# Patient Record
Sex: FEMALE | Race: WHITE | Employment: OTHER | URBAN - METROPOLITAN AREA
[De-identification: names, ages, dates, MRNs, and addresses within clinical notes are randomized per-mention and may not be internally consistent; named-entity substitution may affect disease eponyms.]

---

## 2021-03-03 DIAGNOSIS — F41.9 ANXIETY: Primary | ICD-10-CM

## 2021-03-03 RX ORDER — LORAZEPAM 1 MG/1
TABLET ORAL
Qty: 5 TABLET | Refills: 0 | Status: SHIPPED | OUTPATIENT
Start: 2021-03-03 | End: 2021-03-04

## 2021-03-03 NOTE — PROGRESS NOTES
[place] [facility]  PRIMARY CARE PROVIDER AND CLINIC:  [facility], [address] / [facility] [address]  Chief Complaint   Patient presents with     Hospital F/U     [place] Medical Record Number:  [id number]  Place of Service where encounter took place:  No question data found.    Nicole Guevara  is a 66 year old  (1955), admitted to the above facility from  [facility]. Hospital stay 2/23/21 through 3/3/21..  Admitted to this facility for  rehab, medical management and nursing care.    HPI:    HPI information obtained from: facility chart records, facility staff, patient report and [facility] chart review.   Brief Summary of Hospital Course: Nicole had elective S/p L3-4, L4-5, L5-S1 Posterolateral Fusion, L4-5 Decompression, Posterior Lumbar Interbody Fusion, O-Arm, Stealth, Cell Saver by Dr Trivedi to treat 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  Hospitalization notable for hyperglycemia for DMII and lantus started, pain control, and blood loss anemia noted in progress notes, but HGB in labs stable of 9's. , hypotension - ACE decreased   Notable PMH of morbid obesity, T2DM, HERNANDEZ, HLD    Newly on lantus, prn Tylenol , methocarbamol, oxycodone. TOM increased and ACE was decreased     Updates on Status Since Skilled nursing Admission: difficulty getting medications due to insurance, pt was in sig pain last night. Now much better after receiving needing/ordered medications.     CODE STATUS/ADVANCE DIRECTIVES DISCUSSION:   CPR/Full code   Patient's living condition: lives alone  ALLERGIES: Ibuprofen, Metal [staples], and Penicillins  PAST MEDICAL HISTORY:  has no past medical history on file.  PAST SURGICAL HISTORY:   has no past surgical history on file.  FAMILY HISTORY: family history is not on file.  SOCIAL HISTORY:       Post Discharge Medication Reconciliation Status: discharge medications reconciled, continue medications  without change    Current Outpatient Medications   Medication Sig Dispense Refill     acetaminophen (TYLENOL) 500 MG tablet Take 1,000 mg by mouth every 6 hours       albuterol (PROAIR HFA/PROVENTIL HFA/VENTOLIN HFA) 108 (90 Base) MCG/ACT inhaler Inhale 2 puffs into the lungs 4 times daily as needed for shortness of breath / dyspnea or wheezing       amitriptyline (ELAVIL) 50 MG tablet Take 50 mg by mouth At Bedtime       aspirin 81 MG EC tablet Take 81 mg by mouth daily       bulk laxative (BENEFIBER DRINK MIX) packet Take 1 packet by mouth daily as needed       fluticasone (FLONASE) 50 MCG/ACT nasal spray Spray 1 spray into both nostrils daily as needed for rhinitis or allergies       gabapentin (NEURONTIN) 400 MG capsule Take 400 mg by mouth 3 times daily       insulin aspart (NOVOLOG FLEXPEN) 100 UNIT/ML pen Inject 22 Units Subcutaneous 2 times daily (with meals) Lunch/dinner       insulin aspart (NOVOLOG FLEXPEN) 100 UNIT/ML pen Inject 24 Units Subcutaneous daily (with breakfast)       insulin aspart (NOVOLOG FLEXPEN) 100 UNIT/ML pen Inject Subcutaneous At Bedtime Per Sliding Scale;  If Blood Sugar is 151 to 200, give 1 Units.  If Blood Sugar is 201 to 250, give 2 Units.  If Blood Sugar is 251 to 300, give 4 Units.  If Blood Sugar is 301 to 350, give 6 Units.  If Blood Sugar is 351 to 400, give 8 Units.  If Blood Sugar is greater than 400, give 10 Units.       insulin aspart (NOVOLOG FLEXPEN) 100 UNIT/ML pen Inject Subcutaneous 3 times daily (with meals) Per Sliding Scale;  If Blood Sugar is 151 to 200, give 2 Units.  If Blood Sugar is 201 to 250, give 4 Units.  If Blood Sugar is 251 to 300, give 6 Units.  If Blood Sugar is 301 to 350, give 8 Units.  If Blood Sugar is 351 to 400, give 10 Units.  If Blood Sugar is greater than 400, give 12 Units.       insulin glargine (LANTUS PEN) 100 UNIT/ML pen Inject 38 Units Subcutaneous At Bedtime       lisinopril (ZESTRIL) 10 MG tablet Take 10 mg by mouth daily        LORazepam (ATIVAN) 1 MG tablet Take 1 mg PO in AM and 2 mg PO HS daily. 30 tablet 1     methocarbamol (ROBAXIN) 750 MG tablet Take 750 mg by mouth 3 times daily as needed for muscle spasms       oxyCODONE (ROXICODONE) 5 MG tablet Take 5-10 mg by mouth every 4 hours as needed for severe pain       polyethylene glycol (MIRALAX) 17 g packet Take 17 g by mouth daily       QUEtiapine (SEROQUEL XR) 50 MG TB24 24 hr tablet Take 50 mg by mouth At Bedtime       sennosides (SENOKOT) 8.6 MG tablet Take 2 tablets by mouth 2 times daily       simvastatin (ZOCOR) 5 MG tablet Take 5 mg by mouth At Bedtime       ROS:  4 point ROS including Respiratory, CV, GI and , other than that noted in the HPI,  is negative    Vitals:  BP (!) 141/79   Pulse 84   Temp 98.8  F (37.1  C)   Resp 18   SpO2 96%   Exam:  GENERAL APPEARANCE:  Alert, in no distress  RESP:  respiratory effort and palpation of chest normal, auscultation of lungs clear , no respiratory distress  CV:  Palpation and auscultation of heart done , rate and rhythm reg, no murmur, no peripheral edema  ABDOMEN:  normal bowel sounds, soft, nontender, no hepatosplenomegaly or other masses  M/S:   Gait and station w/c - SBA, Digits and nails intact  SKIN:  Inspection and Palpation of skin and subcutaneous tissue incision not viewed by me as TSLO  Already on, but nsg notes c/d/i  NEURO: 2-12 in normal limits and at patient's baseline  PSYCH:  insight and judgement, memory intact , affect and mood Pleasant     Lab/Diagnostic data:  Recent labs in [facility] reviewed by me today.          ASSESSMENT/PLAN:  Lumbosacral spondylosis with radiculopathy and History of lumbosacral spine surgery: S/p L3-4, L4-5, L5-S1 Posterolateral Fusion, L4-5 Decompression, Posterior Lumbar Interbody Fusion, 2/23/21 and Bilateral sacroiliitis (H)  Doing quite well post op - states pain is now well controlled with medications. Using 10 mg prn oxy - has used 3 times today  Could move to scheduled if needed  next f/u  I dose prn robaxin    Type 2 diabetes mellitus with diabetic neuropathy, with long-term current use of insulin (H)  Well controlled - tight prn sliding scale coverage - will keep for now being so close to post op - but look next week to liberate more and stop hs given risk of hypoglycemia    Hypertension associated with diabetes (H)  Stable with reduced ACE - f/u next week - would increase if needed.     BMI 45.0-49.9, adult (H)  Noted - BMI>30, is consistent with obesity, BMI>35 with complicating disease process or BMI>40 is consistent with morbid obesity. This is clinically significant due to increased nursing cares, use of resources and specialty equipment.    Anxiety  - LORazepam (ATIVAN) 1 MG tablet; Take 1 mg PO in AM and 2 mg PO HS daily.     Orders written by provider at facility  Script to ativan sent to A/E  F/u next week with HTN and Accuchecks /DMII    Code status  Full code -     Total time spent with patient visit at the skilled nursing facility was 35 min including patient visit and review of past records. Greater than 50% of total time spent with counseling and coordinating care due to pain control, medication issues and dmii/insulin changes/hypoptension/ace changes. .     Electronically signed by:  BECKIE Lara CNP

## 2021-03-04 ENCOUNTER — HOSPITAL LABORATORY (OUTPATIENT)
Dept: NURSING HOME | Facility: OTHER | Age: 66
End: 2021-03-04

## 2021-03-04 ENCOUNTER — NURSING HOME VISIT (OUTPATIENT)
Dept: GERIATRICS | Facility: CLINIC | Age: 66
End: 2021-03-04
Payer: MEDICAID

## 2021-03-04 VITALS
DIASTOLIC BLOOD PRESSURE: 79 MMHG | SYSTOLIC BLOOD PRESSURE: 141 MMHG | OXYGEN SATURATION: 96 % | RESPIRATION RATE: 18 BRPM | TEMPERATURE: 98.8 F | HEART RATE: 84 BPM

## 2021-03-04 DIAGNOSIS — I15.2 HYPERTENSION ASSOCIATED WITH DIABETES (H): ICD-10-CM

## 2021-03-04 DIAGNOSIS — E11.59 HYPERTENSION ASSOCIATED WITH DIABETES (H): ICD-10-CM

## 2021-03-04 DIAGNOSIS — Z79.4 TYPE 2 DIABETES MELLITUS WITH DIABETIC NEUROPATHY, WITH LONG-TERM CURRENT USE OF INSULIN (H): ICD-10-CM

## 2021-03-04 DIAGNOSIS — Z98.890 HISTORY OF LUMBOSACRAL SPINE SURGERY: ICD-10-CM

## 2021-03-04 DIAGNOSIS — F41.9 ANXIETY: ICD-10-CM

## 2021-03-04 DIAGNOSIS — M46.1 BILATERAL SACROILIITIS (H): ICD-10-CM

## 2021-03-04 DIAGNOSIS — M47.27 LUMBOSACRAL SPONDYLOSIS WITH RADICULOPATHY: Primary | ICD-10-CM

## 2021-03-04 DIAGNOSIS — E11.40 TYPE 2 DIABETES MELLITUS WITH DIABETIC NEUROPATHY, WITH LONG-TERM CURRENT USE OF INSULIN (H): ICD-10-CM

## 2021-03-04 PROBLEM — M53.3 CHRONIC SI JOINT PAIN: Status: ACTIVE | Noted: 2017-02-23

## 2021-03-04 PROBLEM — M17.11 ARTHRITIS OF RIGHT KNEE: Status: ACTIVE | Noted: 2018-10-25

## 2021-03-04 PROBLEM — Z78.9 HEALTH CARE HOME, ACTIVE CARE COORDINATION: Status: ACTIVE | Noted: 2017-06-12

## 2021-03-04 PROBLEM — M75.101 ROTATOR CUFF TEAR ARTHROPATHY OF RIGHT SHOULDER: Status: ACTIVE | Noted: 2017-02-23

## 2021-03-04 PROBLEM — Z96.652 STATUS POST TOTAL LEFT KNEE REPLACEMENT: Status: ACTIVE | Noted: 2019-06-25

## 2021-03-04 PROBLEM — E11.42 DIABETIC POLYNEUROPATHY ASSOCIATED WITH TYPE 2 DIABETES MELLITUS (H): Status: ACTIVE | Noted: 2017-06-12

## 2021-03-04 PROBLEM — M17.12 ARTHRITIS OF LEFT KNEE: Status: ACTIVE | Noted: 2018-07-16

## 2021-03-04 PROBLEM — M12.811 ROTATOR CUFF TEAR ARTHROPATHY OF RIGHT SHOULDER: Status: ACTIVE | Noted: 2017-02-23

## 2021-03-04 PROBLEM — G89.29 CHRONIC SI JOINT PAIN: Status: ACTIVE | Noted: 2017-02-23

## 2021-03-04 PROBLEM — E66.01 OBESITY, MORBID (H): Status: ACTIVE | Noted: 2021-02-24

## 2021-03-04 PROCEDURE — 99310 SBSQ NF CARE HIGH MDM 45: CPT | Performed by: NURSE PRACTITIONER

## 2021-03-04 RX ORDER — INSULIN ASPART 100 [IU]/ML
22 INJECTION, SOLUTION INTRAVENOUS; SUBCUTANEOUS 2 TIMES DAILY WITH MEALS
COMMUNITY

## 2021-03-04 RX ORDER — SENNOSIDES 8.6 MG
2 TABLET ORAL 2 TIMES DAILY
COMMUNITY

## 2021-03-04 RX ORDER — FLUTICASONE PROPIONATE 50 MCG
1 SPRAY, SUSPENSION (ML) NASAL DAILY PRN
COMMUNITY

## 2021-03-04 RX ORDER — METHOCARBAMOL 750 MG/1
750 TABLET, FILM COATED ORAL 3 TIMES DAILY PRN
COMMUNITY
End: 2021-03-09

## 2021-03-04 RX ORDER — LORAZEPAM 1 MG/1
TABLET ORAL
Qty: 30 TABLET | Refills: 1 | Status: SHIPPED | OUTPATIENT
Start: 2021-03-04 | End: 2021-03-17

## 2021-03-04 RX ORDER — OXYCODONE HYDROCHLORIDE 5 MG/1
5-10 TABLET ORAL EVERY 4 HOURS PRN
COMMUNITY
End: 2021-03-08

## 2021-03-04 RX ORDER — POLYETHYLENE GLYCOL 3350 17 G/17G
17 POWDER, FOR SOLUTION ORAL DAILY
COMMUNITY
End: 2021-03-16

## 2021-03-04 RX ORDER — WHEAT DEXTRIN/ASPARTAME 3 G/6 G
1 POWDER IN PACKET (EA) ORAL EVERY MORNING
COMMUNITY

## 2021-03-04 RX ORDER — ASPIRIN 81 MG/1
81 TABLET ORAL DAILY
COMMUNITY

## 2021-03-04 RX ORDER — LISINOPRIL 10 MG/1
10 TABLET ORAL DAILY
COMMUNITY

## 2021-03-04 RX ORDER — INSULIN ASPART 100 [IU]/ML
INJECTION, SOLUTION INTRAVENOUS; SUBCUTANEOUS AT BEDTIME
COMMUNITY
End: 2021-03-09

## 2021-03-04 RX ORDER — ACETAMINOPHEN 500 MG
1000 TABLET ORAL 4 TIMES DAILY PRN
COMMUNITY

## 2021-03-04 RX ORDER — QUETIAPINE FUMARATE 50 MG/1
50 TABLET, EXTENDED RELEASE ORAL AT BEDTIME
COMMUNITY

## 2021-03-04 RX ORDER — INSULIN ASPART 100 [IU]/ML
24 INJECTION, SOLUTION INTRAVENOUS; SUBCUTANEOUS
COMMUNITY

## 2021-03-04 RX ORDER — INSULIN ASPART 100 [IU]/ML
INJECTION, SOLUTION INTRAVENOUS; SUBCUTANEOUS
COMMUNITY
End: 2021-03-23

## 2021-03-04 RX ORDER — SIMVASTATIN 5 MG
5 TABLET ORAL AT BEDTIME
COMMUNITY

## 2021-03-04 RX ORDER — ALBUTEROL SULFATE 90 UG/1
2 AEROSOL, METERED RESPIRATORY (INHALATION) 4 TIMES DAILY PRN
COMMUNITY

## 2021-03-04 RX ORDER — GABAPENTIN 400 MG/1
400 CAPSULE ORAL 3 TIMES DAILY
COMMUNITY

## 2021-03-04 RX ORDER — AMITRIPTYLINE HYDROCHLORIDE 50 MG/1
50 TABLET ORAL AT BEDTIME
COMMUNITY

## 2021-03-04 NOTE — PROGRESS NOTES
Patient on scheduled ativan 1 mg PO in AM and 2 mg PO at HS - arrived without script. Sent script for #5 tabs 1 mg each with request to take 2 mg dose from [employer] now.     Electronically signed by BECKIE Acuña GNP

## 2021-03-04 NOTE — LETTER
3/4/2021        RE: Nicole Guevara  [address] [facility] [address]        [place] [facility]  PRIMARY CARE PROVIDER AND CLINIC:  [facility], [address]  / [facility] [address]  Chief Complaint   Patient presents with     Hospital F/U     [place] Medical Record Number:  [id number]  Place of Service where encounter took place:  No question data found.    Nicole Guevara  is a 66 year old  (1955), admitted to the above facility from  [facility]. Hospital stay 2/23/21 through 3/3/21..  Admitted to this facility for  rehab, medical management and nursing care.    HPI:    HPI information obtained from: facility chart records, facility staff, patient report and [facility] chart review.   Brief Summary of Hospital Course: Nicole had elective S/p L3-4, L4-5, L5-S1 Posterolateral Fusion, L4-5 Decompression, Posterior Lumbar Interbody Fusion, O-Arm, Stealth, Cell Saver by Dr Trivedi to treat 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  Hospitalization notable for hyperglycemia for DMII and lantus started, pain control, and blood loss anemia noted in progress notes, but HGB in labs stable of 9's. , hypotension - ACE decreased   Notable PMH of morbid obesity, T2DM, HERNANDEZ, HLD    Newly on lantus, prn Tylenol , methocarbamol, oxycodone. TOM increased and ACE was decreased     Updates on Status Since Skilled nursing Admission: difficulty getting medications due to insurance, pt was in sig pain last night. Now much better after receiving needing/ordered medications.     CODE STATUS/ADVANCE DIRECTIVES DISCUSSION:   CPR/Full code   Patient's living condition: lives alone  ALLERGIES: Ibuprofen, Metal [staples], and Penicillins  PAST MEDICAL HISTORY:  has no past medical history on file.  PAST SURGICAL HISTORY:   has no past surgical history on file.  FAMILY HISTORY: family history is not on file.  SOCIAL HISTORY:       Post Discharge  Medication Reconciliation Status: discharge medications reconciled, continue medications without change    Current Outpatient Medications   Medication Sig Dispense Refill     acetaminophen (TYLENOL) 500 MG tablet Take 1,000 mg by mouth every 6 hours       albuterol (PROAIR HFA/PROVENTIL HFA/VENTOLIN HFA) 108 (90 Base) MCG/ACT inhaler Inhale 2 puffs into the lungs 4 times daily as needed for shortness of breath / dyspnea or wheezing       amitriptyline (ELAVIL) 50 MG tablet Take 50 mg by mouth At Bedtime       aspirin 81 MG EC tablet Take 81 mg by mouth daily       bulk laxative (BENEFIBER DRINK MIX) packet Take 1 packet by mouth daily as needed       fluticasone (FLONASE) 50 MCG/ACT nasal spray Spray 1 spray into both nostrils daily as needed for rhinitis or allergies       gabapentin (NEURONTIN) 400 MG capsule Take 400 mg by mouth 3 times daily       insulin aspart (NOVOLOG FLEXPEN) 100 UNIT/ML pen Inject 22 Units Subcutaneous 2 times daily (with meals) Lunch/dinner       insulin aspart (NOVOLOG FLEXPEN) 100 UNIT/ML pen Inject 24 Units Subcutaneous daily (with breakfast)       insulin aspart (NOVOLOG FLEXPEN) 100 UNIT/ML pen Inject Subcutaneous At Bedtime Per Sliding Scale;  If Blood Sugar is 151 to 200, give 1 Units.  If Blood Sugar is 201 to 250, give 2 Units.  If Blood Sugar is 251 to 300, give 4 Units.  If Blood Sugar is 301 to 350, give 6 Units.  If Blood Sugar is 351 to 400, give 8 Units.  If Blood Sugar is greater than 400, give 10 Units.       insulin aspart (NOVOLOG FLEXPEN) 100 UNIT/ML pen Inject Subcutaneous 3 times daily (with meals) Per Sliding Scale;  If Blood Sugar is 151 to 200, give 2 Units.  If Blood Sugar is 201 to 250, give 4 Units.  If Blood Sugar is 251 to 300, give 6 Units.  If Blood Sugar is 301 to 350, give 8 Units.  If Blood Sugar is 351 to 400, give 10 Units.  If Blood Sugar is greater than 400, give 12 Units.       insulin glargine (LANTUS PEN) 100 UNIT/ML pen Inject 38 Units  Subcutaneous At Bedtime       lisinopril (ZESTRIL) 10 MG tablet Take 10 mg by mouth daily       LORazepam (ATIVAN) 1 MG tablet Take 1 mg PO in AM and 2 mg PO HS daily. 30 tablet 1     methocarbamol (ROBAXIN) 750 MG tablet Take 750 mg by mouth 3 times daily as needed for muscle spasms       oxyCODONE (ROXICODONE) 5 MG tablet Take 5-10 mg by mouth every 4 hours as needed for severe pain       polyethylene glycol (MIRALAX) 17 g packet Take 17 g by mouth daily       QUEtiapine (SEROQUEL XR) 50 MG TB24 24 hr tablet Take 50 mg by mouth At Bedtime       sennosides (SENOKOT) 8.6 MG tablet Take 2 tablets by mouth 2 times daily       simvastatin (ZOCOR) 5 MG tablet Take 5 mg by mouth At Bedtime       ROS:  4 point ROS including Respiratory, CV, GI and , other than that noted in the HPI,  is negative    Vitals:  BP (!) 141/79   Pulse 84   Temp 98.8  F (37.1  C)   Resp 18   SpO2 96%   Exam:  GENERAL APPEARANCE:  Alert, in no distress  RESP:  respiratory effort and palpation of chest normal, auscultation of lungs clear , no respiratory distress  CV:  Palpation and auscultation of heart done , rate and rhythm reg, no murmur, no peripheral edema  ABDOMEN:  normal bowel sounds, soft, nontender, no hepatosplenomegaly or other masses  M/S:   Gait and station w/c - SBA, Digits and nails intact  SKIN:  Inspection and Palpation of skin and subcutaneous tissue incision not viewed by me as TSLO  Already on, but nsg notes c/d/i  NEURO: 2-12 in normal limits and at patient's baseline  PSYCH:  insight and judgement, memory intact , affect and mood Pleasant     Lab/Diagnostic data:  Recent labs in [facility] reviewed by me today.          ASSESSMENT/PLAN:  Lumbosacral spondylosis with radiculopathy and History of lumbosacral spine surgery: S/p L3-4, L4-5, L5-S1 Posterolateral Fusion, L4-5 Decompression, Posterior Lumbar Interbody Fusion, 2/23/21 and Bilateral sacroiliitis (H)  Doing quite well post op - states pain is now well controlled with  medications. Using 10 mg prn oxy - has used 3 times today  Could move to scheduled if needed next f/u  I dose prn robaxin    Type 2 diabetes mellitus with diabetic neuropathy, with long-term current use of insulin (H)  Well controlled - tight prn sliding scale coverage - will keep for now being so close to post op - but look next week to liberate more and stop hs given risk of hypoglycemia    Hypertension associated with diabetes (H)  Stable with reduced ACE - f/u next week - would increase if needed.     BMI 45.0-49.9, adult (H)  Noted - BMI>30, is consistent with obesity, BMI>35 with complicating disease process or BMI>40 is consistent with morbid obesity. This is clinically significant due to increased nursing cares, use of resources and specialty equipment.    Anxiety  - LORazepam (ATIVAN) 1 MG tablet; Take 1 mg PO in AM and 2 mg PO HS daily.     Orders written by provider at facility  Script to ativan sent to A/E  F/u next week with HTN and Accuchecks /DMII    Code status  Full code -     Total time spent with patient visit at the [facility] nursing facility was 35 min including patient visit and review of past records. Greater than 50% of total time spent with counseling and coordinating care due to pain control, medication issues and dmii/insulin changes/hypoptension/ace changes. .     Electronically signed by:  BECKIE Lara CNP                           Sincerely,        BECKIE Lara CNP

## 2021-03-06 LAB
GAMMA INTERFERON BACKGROUND BLD IA-ACNC: 0.04 IU/ML
M TB IFN-G CD4+ BCKGRND COR BLD-ACNC: 9.96 IU/ML
M TB TUBERC IFN-G BLD QL: NEGATIVE
MITOGEN IGNF BCKGRD COR BLD-ACNC: 0 IU/ML
MITOGEN IGNF BCKGRD COR BLD-ACNC: 0 IU/ML

## 2021-03-08 ENCOUNTER — TELEPHONE (OUTPATIENT)
Dept: GERIATRICS | Facility: CLINIC | Age: 66
End: 2021-03-08

## 2021-03-08 DIAGNOSIS — Z98.890 HISTORY OF LUMBOSACRAL SPINE SURGERY: Primary | ICD-10-CM

## 2021-03-08 RX ORDER — OXYCODONE HYDROCHLORIDE 5 MG/1
TABLET ORAL
Qty: 30 TABLET | Refills: 0 | Status: SHIPPED | OUTPATIENT
Start: 2021-03-08 | End: 2021-03-13

## 2021-03-08 RX ORDER — LEVOFLOXACIN 250 MG/1
500 TABLET, FILM COATED ORAL DAILY
Qty: 14 TABLET | Refills: 0
Start: 2021-03-08 | End: 2021-03-16

## 2021-03-08 NOTE — TELEPHONE ENCOUNTER
"[place] [facility] TELEPHONE ENCOUNTER  Nicole Guevara is a 66 year old  (1955), Nurse reported  \"erythema to the periwound, slough developing on the incision line with new openings and increased drainage. She also complained of increased pain that is not being treated with her current pain regimen and non-pharm interventions. I have started triad to any slough along the incision. Would you like to initiate an antibiotic? She is having daily dressing changes at this time, would you like the dressings to continue with a daily change or would you like it increased due to increased drainage? She has some tape burn where the dressing is adhered I am going to issue out KIND tape to the staff to use instead with Skin prep.\"      Plan:  Wound infection:  levaquin 500 q day x 7 days  Increase wound care to BID and BID PRN drainage.   Please call and update surgeon of new findings.   Start oxycodone 5 mg po QID scheduled  discontinue current oxycodone prn  Start oxycodone 5 mg po QID PRN moderate to severe pain.       Electronically signed by:   BECKIE Lara CNP    "

## 2021-03-09 ENCOUNTER — NURSING HOME VISIT (OUTPATIENT)
Dept: GERIATRICS | Facility: CLINIC | Age: 66
End: 2021-03-09
Payer: MEDICAID

## 2021-03-09 VITALS
TEMPERATURE: 98.9 F | DIASTOLIC BLOOD PRESSURE: 70 MMHG | HEART RATE: 97 BPM | HEIGHT: 61 IN | SYSTOLIC BLOOD PRESSURE: 133 MMHG | OXYGEN SATURATION: 98 % | BODY MASS INDEX: 43.63 KG/M2 | WEIGHT: 231.1 LBS | RESPIRATION RATE: 16 BRPM

## 2021-03-09 DIAGNOSIS — Z98.890 HISTORY OF LUMBOSACRAL SPINE SURGERY: Primary | ICD-10-CM

## 2021-03-09 DIAGNOSIS — Z79.4 TYPE 2 DIABETES MELLITUS WITH DIABETIC NEUROPATHY, WITH LONG-TERM CURRENT USE OF INSULIN (H): ICD-10-CM

## 2021-03-09 DIAGNOSIS — G89.18 ACUTE POST-OPERATIVE PAIN: ICD-10-CM

## 2021-03-09 DIAGNOSIS — T81.49XA POSTOPERATIVE WOUND CELLULITIS: ICD-10-CM

## 2021-03-09 DIAGNOSIS — E11.40 TYPE 2 DIABETES MELLITUS WITH DIABETIC NEUROPATHY, WITH LONG-TERM CURRENT USE OF INSULIN (H): ICD-10-CM

## 2021-03-09 PROCEDURE — 99309 SBSQ NF CARE MODERATE MDM 30: CPT | Performed by: NURSE PRACTITIONER

## 2021-03-09 RX ORDER — HYDROXYZINE PAMOATE 50 MG/1
50 CAPSULE ORAL 4 TIMES DAILY PRN
Qty: 30 CAPSULE | Refills: 3 | Status: SHIPPED | OUTPATIENT
Start: 2021-03-09 | End: 2021-03-23

## 2021-03-09 RX ORDER — METHOCARBAMOL 750 MG/1
750 TABLET, FILM COATED ORAL 4 TIMES DAILY
Qty: 60 TABLET | Refills: 3 | Status: SHIPPED | OUTPATIENT
Start: 2021-03-09 | End: 2021-03-23

## 2021-03-09 ASSESSMENT — MIFFLIN-ST. JEOR: SCORE: 1535.64

## 2021-03-09 NOTE — LETTER
"    3/9/2021        RE: Nicole Guevara  [address] [facility] [address]        [place] [facility]    Chief Complaint   Patient presents with     Nursing Home Acute     HPI:    Nicole Guevara is a 66 year old  (1955), who is being seen today for an episodic care visit at: [facility] AND REHAB [facility] [[id number]]  Today's concern is: f/u back pain - and new cellulitis started yesterday - levaquin started.    Nicole is rehabbing s/p L3-4, L4-5, L5-S1 Posterolateral Fusion, L4-5 Decompression, Posterior Lumbar Interbody Fusion, O-Arm, Stealth, Cell Saver by Dr Trivedi to treat 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  Hospitalization notable for hyperglycemia for DMII and lantus started, pain control, and blood loss anemia noted in progress notes, but HGB in labs stable of 9's, hypotension - ACE decreased  At SNF:  3/8: levaquin 500 q day x 7 days  Increase wound care to BID and BID PRN drainage.   Please call and update surgeon of new findings.   Start oxycodone 5 mg po QID scheduled  discontinue current oxycodone prn  Start oxycodone 5 mg po QID PRN moderate to severe pain.     Today she states her pain still isn't controlled and the muscle relaxors help the most    PMH/PSH reviewed in EPIC today.  REVIEW OF SYSTEMS:  4 point ROS including Respiratory, CV, GI and , other than that noted in the HPI,  is negative    EXAM:  /70   Pulse 97   Temp 98.9  F (37.2  C)   Resp 16   Ht 1.549 m (5' 1\")   Wt 104.8 kg (231 lb 1.6 oz)   SpO2 98%   BMI 43.67 kg/m    GENERAL APPEARANCE:  Alert, in no distress  RESP:  respiratory effort and palpation of chest normal, auscultation of lungs clear , no respiratory distress  CV:  Palpation and auscultation of heart done , rate and rhythm reg, no murmur, no peripheral edema  ABDOMEN:  normal bowel sounds, soft, nontender, no hepatosplenomegaly or other masses  M/S:   Gait and station painful, Digits " and nails intact  SKIN:  Inspection and Palpation of skin and subcutaneous tissue intact except back incision - see pic below.   NEURO: 2-12 in normal limits and at patient's baseline  PSYCH:  insight and judgement, memory intact , affect and mood Pleasant       Recent labs in EPIC reviewed by me today.   No new labs.   Accuchecks reviewed and all mainly under 200  Assessment/Plan:  History of lumbosacral spine surgery: S/p L3-4, L4-5, L5-S1 Posterolateral Fusion, L4-5 Decompression, Posterior Lumbar Interbody Fusion, 2/23/21 and ostoperative wound cellulitis and Acute post-operative pain  Cont ABX and f/u surg -   Will adjust pain medications with muscle relaxors.     Type 2 diabetes mellitus with diabetic neuropathy, with long-term current use of insulin (H)  Controlled- will liberate to stop HS checks and sliding scale - will move long acting to am - discussed reasons why with pt. - she agrees  Goal is to stop long acting before she gets home as she wasn't on it before.     Orders:  Change robaxin to 750 mg po QID  start prn vistaril to 50 mg po QID PRN x 14 days  discontinue HS accuchecks and novolg sliding scale   Change lantus to be to given in am (hold 3/9 and start am 3/10)    Electronically signed by:  BECKIE Lara CNP              Sincerely,        BECKIE Lara CNP

## 2021-03-09 NOTE — PROGRESS NOTES
"[place] [facility]    Chief Complaint   Patient presents with     Nursing Home Acute     HPI:    Nicole Guevara is a 66 year old  (1955), who is being seen today for an episodic care visit at: [facility] AND REH[facility] ([facility] [[id number]]  Today's concern is: f/u back pain - and new cellulitis started yesterday - levaquin started.    Nicole is rehabbing s/p L3-4, L4-5, L5-S1 Posterolateral Fusion, L4-5 Decompression, Posterior Lumbar Interbody Fusion, O-Arm, Stealth, Cell Saver by Dr Trivedi to treat 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  Hospitalization notable for hyperglycemia for DMII and lantus started, pain control, and blood loss anemia noted in progress notes, but HGB in labs stable of 9's, hypotension - ACE decreased  At SNF:  3/8: levaquin 500 q day x 7 days  Increase wound care to BID and BID PRN drainage.   Please call and update surgeon of new findings.   Start oxycodone 5 mg po QID scheduled  discontinue current oxycodone prn  Start oxycodone 5 mg po QID PRN moderate to severe pain.     Today she states her pain still isn't controlled and the muscle relaxors help the most    PMH/PSH reviewed in [facility] today.  REVIEW OF SYSTEMS:  4 point ROS including Respiratory, CV, GI and , other than that noted in the HPI,  is negative    EXAM:  /70   Pulse 97   Temp 98.9  F (37.2  C)   Resp 16   Ht 1.549 m (5' 1\")   Wt 104.8 kg (231 lb 1.6 oz)   SpO2 98%   BMI 43.67 kg/m    GENERAL APPEARANCE:  Alert, in no distress  RESP:  respiratory effort and palpation of chest normal, auscultation of lungs clear , no respiratory distress  CV:  Palpation and auscultation of heart done , rate and rhythm reg, no murmur, no peripheral edema  ABDOMEN:  normal bowel sounds, soft, nontender, no hepatosplenomegaly or other masses  M/S:   Gait and station painful, Digits and nails intact  SKIN:  Inspection and Palpation of skin and subcutaneous tissue " intact except back incision - see pic below.   NEURO: 2-12 in normal limits and at patient's baseline  PSYCH:  insight and judgement, memory intact , affect and mood Pleasant       Recent labs in EPIC reviewed by me today.   No new labs.   Accuchecks reviewed and all mainly under 200  Assessment/Plan:  History of lumbosacral spine surgery: S/p L3-4, L4-5, L5-S1 Posterolateral Fusion, L4-5 Decompression, Posterior Lumbar Interbody Fusion, 2/23/21 and ostoperative wound cellulitis and Acute post-operative pain  Cont ABX and f/u surg -   Will adjust pain medications with muscle relaxors.     Type 2 diabetes mellitus with diabetic neuropathy, with long-term current use of insulin (H)  Controlled- will liberate to stop HS checks and sliding scale - will move long acting to am - discussed reasons why with pt. - she agrees  Goal is to stop long acting before she gets home as she wasn't on it before.     Orders:  Change robaxin to 750 mg po QID  start prn vistaril to 50 mg po QID PRN x 14 days  discontinue HS accuchecks and novolg sliding scale   Change lantus to be to given in am (hold 3/9 and start am 3/10)    Electronically signed by:  BECKIE Lara CNP

## 2021-03-11 ENCOUNTER — NURSING HOME VISIT (OUTPATIENT)
Dept: GERIATRICS | Facility: CLINIC | Age: 66
End: 2021-03-11
Payer: MEDICAID

## 2021-03-11 VITALS
RESPIRATION RATE: 16 BRPM | TEMPERATURE: 98.5 F | HEIGHT: 61 IN | HEART RATE: 86 BPM | DIASTOLIC BLOOD PRESSURE: 83 MMHG | BODY MASS INDEX: 43.63 KG/M2 | SYSTOLIC BLOOD PRESSURE: 133 MMHG | OXYGEN SATURATION: 92 % | WEIGHT: 231.1 LBS

## 2021-03-11 DIAGNOSIS — T81.49XA POSTOPERATIVE WOUND CELLULITIS: Primary | ICD-10-CM

## 2021-03-11 DIAGNOSIS — G89.18 ACUTE POST-OPERATIVE PAIN: ICD-10-CM

## 2021-03-11 DIAGNOSIS — Z98.890 HISTORY OF LUMBOSACRAL SPINE SURGERY: ICD-10-CM

## 2021-03-11 PROCEDURE — 99309 SBSQ NF CARE MODERATE MDM 30: CPT | Performed by: NURSE PRACTITIONER

## 2021-03-11 RX ORDER — LIDOCAINE 4 G/G
2 PATCH TOPICAL EVERY 24 HOURS
COMMUNITY
End: 2021-03-16

## 2021-03-11 ASSESSMENT — MIFFLIN-ST. JEOR: SCORE: 1535.64

## 2021-03-11 NOTE — PROGRESS NOTES
"[place] [facility]    Chief Complaint   Patient presents with     Nursing Home Acute     HPI:    Nicole Guevara is a 66 year old  (1955), who is being seen today for an episodic care visit at: [facility] AND REHAB [facility] ([facility]) [[id number]]  Today's concern is: f/u on surgical incision cellulitis - started ABX    Nicole is rehabbing s/p L3-4, L4-5, L5-S1 Posterolateral Fusion, L4-5 Decompression, Posterior Lumbar Interbody Fusion, O-Arm, Stealth, Cell Saver by Dr Trivedi to treat 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  Hospitalization notable for hyperglycemia for DMII and lantus started, pain control, and blood loss anemia noted in progress notes, but HGB in labs stable of 9's, hypotension - ACE decreased  At SNF:  3/8: levaquin 500 q day x 7 days  Increase wound care to BID and BID PRN drainage.   Please call and update surgeon of new findings.   Start oxycodone 5 mg po QID scheduled  discontinue current oxycodone prn  Start oxycodone 5 mg po QID PRN moderate to severe pain.   3/9: Change robaxin to 750 mg po QID, start prn vistaril to 50 mg po QID PRN x 14 days  discontinue HS accuchecks and novolg sliding scale , Change lantus to be to given in am (hold 3/9 and start am 3/10)    Today she is using still quite a bit of PRNs - robaxin and oxy and vistaril, she notes he just can't get comfortable at night and isn't sleeping. Still some pain during the night.   Nsg notes she isn't wearing her TSLO brace often - (not in bed at all and does spend sig time in bed.     PMH/PSH reviewed in EPIC today.  REVIEW OF SYSTEMS:  4 point ROS including Respiratory, CV, GI and , other than that noted in the HPI,  is negative    EXAM:  /83   Pulse 86   Temp 98.5  F (36.9  C)   Resp 16   Ht 1.549 m (5' 1\")   Wt 104.8 kg (231 lb 1.6 oz)   SpO2 92%   BMI 43.67 kg/m    GENERAL APPEARANCE:  Alert, in no distress  RESP:  respiratory effort and palpation of " chest normal, auscultation of lungs clear , no respiratory distress  CV:  Palpation and auscultation of heart done , rate and rhythm reg, no murmur, no peripheral edema  ABDOMEN:  normal bowel sounds, soft, nontender, no hepatosplenomegaly or other masses  M/S:   Gait and station sBA, Digits and nails intact  SKIN:  Inspection and Palpation of skin and subcutaneous tissue intact except back incision - appears to be healing, but still needing aBX>   NEURO: 2-12 in normal limits and at patient's baseline  PSYCH:  insight and judgement, memory fair , affect and mood Pleasant           No recent labs.     Assessment/Plan:     Postoperative wound cellulitis  History of lumbosacral spine surgery  Acute post-operative pain     May need to extend aBX for 10 days.   Will try lidocaine patch for pain - monitor.     Orders:  Discontinue prn robaxin  Start lidocaine 4% patch 2 - to back q hs x 12 hrs.     Electronically signed by:  BECKIE Lara CNP

## 2021-03-11 NOTE — LETTER
"    3/11/2021        RE: Nicole Guevara  [id number] [facility] [address]        [place] [facility]    Chief Complaint   Patient presents with     Nursing Home Acute     HPI:    Nicole Guevara is a 66 year old  (1955), who is being seen today for an episodic care visit at: [facility] AND REHAB [facility] ([facility]) [[id number]]  Today's concern is: f/u on surgical incision cellulitis - started ABX    Nicole is rehabbing s/p L3-4, L4-5, L5-S1 Posterolateral Fusion, L4-5 Decompression, Posterior Lumbar Interbody Fusion, O-Arm, Stealth, Cell Saver by Dr Trivedi to treat 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  Hospitalization notable for hyperglycemia for DMII and lantus started, pain control, and blood loss anemia noted in progress notes, but HGB in labs stable of 9's, hypotension - ACE decreased  At SNF:  3/8: levaquin 500 q day x 7 days  Increase wound care to BID and BID PRN drainage.   Please call and update surgeon of new findings.   Start oxycodone 5 mg po QID scheduled  discontinue current oxycodone prn  Start oxycodone 5 mg po QID PRN moderate to severe pain.   3/9: Change robaxin to 750 mg po QID, start prn vistaril to 50 mg po QID PRN x 14 days  discontinue HS accuchecks and novolg sliding scale , Change lantus to be to given in am (hold 3/9 and start am 3/10)    Today she is using still quite a bit of PRNs - robaxin and oxy and vistaril, she notes he just can't get comfortable at night and isn't sleeping. Still some pain during the night.   Nsg notes she isn't wearing her TSLO brace often - (not in bed at all and does spend sig time in bed.     PMH/PSH reviewed in EPIC today.  REVIEW OF SYSTEMS:  4 point ROS including Respiratory, CV, GI and , other than that noted in the HPI,  is negative    EXAM:  /83   Pulse 86   Temp 98.5  F (36.9  C)   Resp 16   Ht 1.549 m (5' 1\")   Wt 104.8 kg (231 lb 1.6 oz)   SpO2 92%   BMI 43.67 kg/m  "   GENERAL APPEARANCE:  Alert, in no distress  RESP:  respiratory effort and palpation of chest normal, auscultation of lungs clear , no respiratory distress  CV:  Palpation and auscultation of heart done , rate and rhythm reg, no murmur, no peripheral edema  ABDOMEN:  normal bowel sounds, soft, nontender, no hepatosplenomegaly or other masses  M/S:   Gait and station sBA, Digits and nails intact  SKIN:  Inspection and Palpation of skin and subcutaneous tissue intact except back incision - appears to be healing, but still needing aBX>   NEURO: 2-12 in normal limits and at patient's baseline  PSYCH:  insight and judgement, memory fair , affect and mood Pleasant           No recent labs.     Assessment/Plan:     Postoperative wound cellulitis  History of lumbosacral spine surgery  Acute post-operative pain     May need to extend aBX for 10 days.   Will try lidocaine patch for pain - monitor.     Orders:  Discontinue prn robaxin  Start lidocaine 4% patch 2 - to back q hs x 12 hrs.     Electronically signed by:  BECKIE Lara CNP              Sincerely,        BECKIE Lara CNP

## 2021-03-13 ENCOUNTER — TELEPHONE (OUTPATIENT)
Dept: GERIATRICS | Facility: CLINIC | Age: 66
End: 2021-03-13

## 2021-03-13 DIAGNOSIS — Z98.890 HISTORY OF LUMBOSACRAL SPINE SURGERY: ICD-10-CM

## 2021-03-13 RX ORDER — OXYCODONE HYDROCHLORIDE 5 MG/1
TABLET ORAL
Qty: 24 TABLET | Refills: 0 | Status: SHIPPED | OUTPATIENT
Start: 2021-03-13 | End: 2021-03-17

## 2021-03-16 ENCOUNTER — DISCHARGE SUMMARY NURSING HOME (OUTPATIENT)
Dept: GERIATRICS | Facility: CLINIC | Age: 66
End: 2021-03-16
Payer: MEDICAID

## 2021-03-16 VITALS
HEART RATE: 86 BPM | WEIGHT: 232.5 LBS | HEIGHT: 61 IN | OXYGEN SATURATION: 95 % | BODY MASS INDEX: 43.9 KG/M2 | RESPIRATION RATE: 18 BRPM | SYSTOLIC BLOOD PRESSURE: 108 MMHG | TEMPERATURE: 98.7 F | DIASTOLIC BLOOD PRESSURE: 62 MMHG

## 2021-03-16 DIAGNOSIS — Z79.4 TYPE 2 DIABETES MELLITUS WITH DIABETIC NEUROPATHY, WITH LONG-TERM CURRENT USE OF INSULIN (H): ICD-10-CM

## 2021-03-16 DIAGNOSIS — K59.01 SLOW TRANSIT CONSTIPATION: ICD-10-CM

## 2021-03-16 DIAGNOSIS — T81.49XA POSTOPERATIVE WOUND CELLULITIS: ICD-10-CM

## 2021-03-16 DIAGNOSIS — G89.18 ACUTE POST-OPERATIVE PAIN: Primary | ICD-10-CM

## 2021-03-16 DIAGNOSIS — E11.40 TYPE 2 DIABETES MELLITUS WITH DIABETIC NEUROPATHY, WITH LONG-TERM CURRENT USE OF INSULIN (H): ICD-10-CM

## 2021-03-16 DIAGNOSIS — Z98.890 HISTORY OF LUMBOSACRAL SPINE SURGERY: ICD-10-CM

## 2021-03-16 PROCEDURE — 99309 SBSQ NF CARE MODERATE MDM 30: CPT | Performed by: NURSE PRACTITIONER

## 2021-03-16 RX ORDER — LEVOFLOXACIN 250 MG/1
500 TABLET, FILM COATED ORAL DAILY
Qty: 14 TABLET | Refills: 0
Start: 2021-03-16 | End: 2021-03-23

## 2021-03-16 ASSESSMENT — MIFFLIN-ST. JEOR: SCORE: 1541.99

## 2021-03-16 NOTE — LETTER
"    3/16/2021        RE: Nicole Guevara  [address] [facility] [address]        [place] [facility]    Chief Complaint   Patient presents with     Discharge Summary Nursing Home     HPI:    Nicole Guevara is a 66 year old  (1955), who is being seen today for an episodic care visit at: [facility] AND REHAB [facility] ([facility]) [[id number]]  Today's concern is: she was planning on going home this week, but now postponning to next week after ortho f/u.   C/o ongoing pain today- somewhat better, but still bad enough she can't sleep. .    Nicole is rehabbing s/p L3-4, L4-5, L5-S1 Posterolateral Fusion, L4-5 Decompression, Posterior Lumbar Interbody Fusion, O-Arm, Stealth, Cell Saver by Dr Trivedi to treat 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  Hospitalization notable for hyperglycemia for DMII and lantus started, pain control, and blood loss anemia noted in progress notes, but HGB in labs stable of 9's, hypotension - ACE decreased  At SNF:  3/8: levaquin 500 q day x 7 days  Increase wound care to BID and BID PRN drainage. ,Please call and update surgeon of new findings. Start oxycodone 5 mg po QID scheduled discontinue current oxycodone prn  Start oxycodone 5 mg po QID PRN moderate to severe pain.   3/9: Change robaxin to 750 mg po QID, start prn vistaril to 50 mg po QID PRN x 14 days  discontinue HS accuchecks and novolg sliding scale , Change lantus to be to given in am (hold 3/9 and start am 3/10)  3/11: lidoderm patches ordered, but pt didn't want to pay - didn't start.     PMH/PSH reviewed in [facility] today.  REVIEW OF SYSTEMS:  4 point ROS including Respiratory, CV, GI and , other than that noted in the HPI,  is negative    EXAM:  /62   Pulse 86   Temp 98.7  F (37.1  C)   Resp 18   Ht 1.549 m (5' 1\")   Wt 105.5 kg (232 lb 8 oz)   SpO2 95%   BMI 43.93 kg/m    GENERAL APPEARANCE:  Alert, in no distress  RESP:  no respiratory distress  CV:   no " peripheral edema  ABDOMEN:  normal bowel sounds, soft, nontender, no hepatosplenomegaly or other masses  M/S:   Gait and station SBA, Digits and nails intact  SKIN:  Inspection and Palpation of skin and subcutaneous tissue healing incision, but still red and cellulitis ongoing.   NEURO: 2-12 in normal limits and at patient's baseline  PSYCH:  insight and judgement, memory intact , affect and mood Pleasant     Recent labs in [facility] reviewed by me today.     Assessment/Plan:  History of lumbosacral spine surgery: S/p L3-4, L4-5, L5-S1 Posterolateral Fusion, L4-5 Decompression, Posterior Lumbar Interbody Fusion, 2/23/21 and Acute post-operative pain and Postoperative wound cellulitis  Appears to still have cellulitis - will cont aBX  Acknowledge did have a break, but will complete 14 d  Pain controlled better. Will f/u on labs.   - levofloxacin (LEVAQUIN) 250 MG tablet; Take 2 tablets (500 mg) by mouth daily for 7 days    Type 2 diabetes mellitus with diabetic neuropathy, with long-term current use of insulin (H)  improved    Slow transit constipation  Will change mirilax to benefiber per pt request.     Orders:  discontinue mirilax  Change Tylenol to 1000 qid (from q 6)  Change benefiber to q am  Start levaquin 500 q am x 7 days dx cellulitis.nsg to call surg to update on abx restart.   Cbc and bmp on 3/18  Extend stop date to prn vistaril to 3/30    Electronically signed by:  BECKIE Lara CNP            Sincerely,        BECKIE Lara CNP

## 2021-03-16 NOTE — PROGRESS NOTES
"[place] [facility]    Chief Complaint   Patient presents with     Discharge Summary Nursing Home     HPI:    Nicole Guevara is a 66 year old  (1955), who is being seen today for an episodic care visit at: [facility] AND REHAB [facility] ([facility]) [[id number]]  Today's concern is: she was planning on going home this week, but now postponning to next week after ortho f/u.   C/o ongoing pain today- somewhat better, but still bad enough she can't sleep. .    Nicole is rehabbing s/p L3-4, L4-5, L5-S1 Posterolateral Fusion, L4-5 Decompression, Posterior Lumbar Interbody Fusion, O-Arm, Stealth, Cell Saver by Dr Trivedi to treat 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  Hospitalization notable for hyperglycemia for DMII and lantus started, pain control, and blood loss anemia noted in progress notes, but HGB in labs stable of 9's, hypotension - ACE decreased  At SNF:  3/8: levaquin 500 q day x 7 days  Increase wound care to BID and BID PRN drainage. ,Please call and update surgeon of new findings. Start oxycodone 5 mg po QID scheduled discontinue current oxycodone prn  Start oxycodone 5 mg po QID PRN moderate to severe pain.   3/9: Change robaxin to 750 mg po QID, start prn vistaril to 50 mg po QID PRN x 14 days  discontinue HS accuchecks and novolg sliding scale , Change lantus to be to given in am (hold 3/9 and start am 3/10)  3/11: lidoderm patches ordered, but pt didn't want to pay - didn't start.     PMH/PSH reviewed in [facility] today.  REVIEW OF SYSTEMS:  4 point ROS including Respiratory, CV, GI and , other than that noted in the HPI,  is negative    EXAM:  /62   Pulse 86   Temp 98.7  F (37.1  C)   Resp 18   Ht 1.549 m (5' 1\")   Wt 105.5 kg (232 lb 8 oz)   SpO2 95%   BMI 43.93 kg/m    GENERAL APPEARANCE:  Alert, in no distress  RESP:  no respiratory distress  CV:   no peripheral edema  ABDOMEN:  normal bowel sounds, soft, nontender, no " hepatosplenomegaly or other masses  M/S:   Gait and station SBA, Digits and nails intact  SKIN:  Inspection and Palpation of skin and subcutaneous tissue healing incision, but still red and cellulitis ongoing.   NEURO: 2-12 in normal limits and at patient's baseline  PSYCH:  insight and judgement, memory intact , affect and mood Pleasant     Recent labs in [facility] reviewed by me today.     Assessment/Plan:  History of lumbosacral spine surgery: S/p L3-4, L4-5, L5-S1 Posterolateral Fusion, L4-5 Decompression, Posterior Lumbar Interbody Fusion, 2/23/21 and Acute post-operative pain and Postoperative wound cellulitis  Appears to still have cellulitis - will cont aBX  Acknowledge did have a break, but will complete 14 d  Pain controlled better. Will f/u on labs.   - levofloxacin (LEVAQUIN) 250 MG tablet; Take 2 tablets (500 mg) by mouth daily for 7 days    Type 2 diabetes mellitus with diabetic neuropathy, with long-term current use of insulin (H)  improved    Slow transit constipation  Will change mirilax to benefiber per pt request.     Orders:  discontinue mirilax  Change Tylenol to 1000 qid (from q 6)  Change benefiber to q am  Start levaquin 500 q am x 7 days dx cellulitis.nsg to call surg to update on abx restart.   Cbc and bmp on 3/18  Extend stop date to prn vistaril to 3/30    Electronically signed by:  BECKIE Lara CNP

## 2021-03-17 DIAGNOSIS — Z98.890 HISTORY OF LUMBOSACRAL SPINE SURGERY: ICD-10-CM

## 2021-03-17 RX ORDER — OXYCODONE HYDROCHLORIDE 5 MG/1
TABLET ORAL
Qty: 28 TABLET | Refills: 0 | Status: SHIPPED | OUTPATIENT
Start: 2021-03-17 | End: 2021-03-23

## 2021-03-17 NOTE — TELEPHONE ENCOUNTER
History of lumbosacral spine surgery: S/p L3-4, L4-5, L5-S1 Posterolateral Fusion, L4-5 Decompression, Posterior Lumbar Interbody Fusion, 2/23/21    - oxyCODONE (ROXICODONE) 5 MG tablet; 1 TABLET BY MOUTH FOUR TIMES DAILY;1 TABLET BY MOUTH FOUR TIMES DAILY AS NEEDED

## 2021-03-18 ENCOUNTER — HOSPITAL LABORATORY (OUTPATIENT)
Dept: NURSING HOME | Facility: OTHER | Age: 66
End: 2021-03-18

## 2021-03-18 LAB
ANION GAP SERPL CALCULATED.3IONS-SCNC: 5 MMOL/L (ref 3–14)
BUN SERPL-MCNC: 14 MG/DL (ref 7–30)
CALCIUM SERPL-MCNC: 9.4 MG/DL (ref 8.5–10.1)
CHLORIDE SERPL-SCNC: 107 MMOL/L (ref 94–109)
CO2 SERPL-SCNC: 25 MMOL/L (ref 20–32)
CREAT SERPL-MCNC: 0.55 MG/DL (ref 0.52–1.04)
ERYTHROCYTE [DISTWIDTH] IN BLOOD BY AUTOMATED COUNT: 13.1 % (ref 10–15)
GFR SERPL CREATININE-BSD FRML MDRD: >90 ML/MIN/{1.73_M2}
GLUCOSE SERPL-MCNC: 141 MG/DL (ref 70–99)
HCT VFR BLD AUTO: 31.7 % (ref 35–47)
HGB BLD-MCNC: 9.9 G/DL (ref 11.7–15.7)
MCH RBC QN AUTO: 27.8 PG (ref 26.5–33)
MCHC RBC AUTO-ENTMCNC: 31.2 G/DL (ref 31.5–36.5)
MCV RBC AUTO: 89 FL (ref 78–100)
PLATELET # BLD AUTO: 308 10E9/L (ref 150–450)
POTASSIUM SERPL-SCNC: 4 MMOL/L (ref 3.4–5.3)
RBC # BLD AUTO: 3.56 10E12/L (ref 3.8–5.2)
SODIUM SERPL-SCNC: 137 MMOL/L (ref 133–144)
WBC # BLD AUTO: 8.1 10E9/L (ref 4–11)

## 2021-03-19 PROBLEM — B37.2 YEAST DERMATITIS: Status: ACTIVE | Noted: 2020-01-02

## 2021-03-19 PROBLEM — M54.9 BACKACHE: Status: ACTIVE | Noted: 2019-09-22

## 2021-03-19 PROBLEM — M48.062 LUMBAR STENOSIS WITH NEUROGENIC CLAUDICATION: Status: ACTIVE | Noted: 2020-09-08

## 2021-03-19 PROBLEM — Z71.89 ACP (ADVANCE CARE PLANNING): Status: ACTIVE | Noted: 2018-10-25

## 2021-03-19 PROBLEM — M54.42 CHRONIC MIDLINE LOW BACK PAIN WITH BILATERAL SCIATICA: Status: ACTIVE | Noted: 2021-01-13

## 2021-03-19 PROBLEM — E66.09 OBESITY DUE TO EXCESS CALORIES: Status: ACTIVE | Noted: 2020-09-08

## 2021-03-19 PROBLEM — M54.41 CHRONIC MIDLINE LOW BACK PAIN WITH BILATERAL SCIATICA: Status: ACTIVE | Noted: 2021-01-13

## 2021-03-19 PROBLEM — M43.17 SPONDYLOLISTHESIS OF LUMBOSACRAL REGION: Status: ACTIVE | Noted: 2021-01-13

## 2021-03-19 PROBLEM — G89.29 CHRONIC MIDLINE LOW BACK PAIN WITH BILATERAL SCIATICA: Status: ACTIVE | Noted: 2021-01-13

## 2021-03-20 ENCOUNTER — THERAPY VISIT (OUTPATIENT)
Dept: GERIATRICS | Facility: CLINIC | Age: 66
End: 2021-03-20

## 2021-03-22 VITALS
HEART RATE: 86 BPM | DIASTOLIC BLOOD PRESSURE: 46 MMHG | SYSTOLIC BLOOD PRESSURE: 93 MMHG | TEMPERATURE: 98.5 F | BODY MASS INDEX: 43.71 KG/M2 | RESPIRATION RATE: 18 BRPM | HEIGHT: 61 IN | WEIGHT: 231.5 LBS | OXYGEN SATURATION: 94 %

## 2021-03-22 ASSESSMENT — MIFFLIN-ST. JEOR: SCORE: 1537.46

## 2021-03-22 NOTE — PROGRESS NOTES
[place] [facility] DISCHARGE SUMMARY  PATIENT'S NAME: Nicole Guevara  YOB: 1955  MEDICAL RECORD NUMBER:  [id number]  Place of Service where encounter took place:  [facility] AND REHAB [facility] ([facility]) [[id number]]  PRIMARY CARE PROVIDER AND CLINIC RESPONSIBLE AFTER TRANSFER: [facility] [address] / [facility] [place] [address]; Phone: [phone]; Fax: [phone]; Non-G Provider   Transferring providers: BECKIE Evans CNP; Kiko Bishop MD  Recent Hospitalization/ED: [facility] stay 2/23/21 to 3/3/21.  Date of SNF Admission: March/03/2021  Date of SNF (anticipated) Discharge: March/23/2021  Discharged to: previous independent home  Cognitive Scores: BIMS: 15/15  Physical Function: Ambulating 50 ft with 2 WW  DME: None    CODE STATUS/ADVANCE DIRECTIVES DISCUSSION: Full Code   ALLERGIES: Ciprofloxacin, Amoxicillin, Ibuprofen, Metal [staples], Neomycin-polymyxin-hc, and Penicillins    DISCHARGE DIAGNOSIS/NURSING FACILITY COURSE:    Nicole is rehabbing s/p L3-4, L4-5, L5-S1 Posterolateral Fusion, L4-5 Decompression, Posterior Lumbar Interbody Fusion, O-Arm, Stealth, Cell Saver by Dr Trivedi to treat 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  Hospitalization notable for hyperglycemia for DMII and lantus started, pain control, and blood loss anemia noted in progress notes, but HGB in labs stable of 9's, hypotension - ACE decreased  At SNF:  3/8: levaquin 500 q day x 7 days  Increase wound care to BID and BID PRN drainage. ,Please call and update surgeon of new findings. Start oxycodone 5 mg po QID scheduled discontinue current oxycodone prn  Start oxycodone 5 mg po QID PRN moderate to severe pain.   3/9: Change robaxin to 750 mg po QID, start prn vistaril to 50 mg po QID PRN x 14 days  discontinue HS accuchecks and novolg sliding scale , Change lantus to be to given in am (hold 3/9 and start am 3/10)  3/11:  lidoderm patches ordered, but pt didn't want to pay - didn't start.  3/16: scheduled Tylenol , stop mirilax, start benefiber, restart levaquin 500 q am x 7  3/22: NEuro surg f/u - OK to go home.     Today she is now eager to go home.   History of lumbosacral spine surgery: S/p L3-4, L4-5, L5-S1 Posterolateral Fusion, L4-5 Decompression, Posterior Lumbar Interbody Fusion, 2/23/21 and Acute post-operative pain   and Postoperative wound cellulitis  Resolved infection - pain better controlled.   Neuro surg sent scripts to pharm.     Anxiety  PTA medications - will stop vistaril.     DMII  St[facility]. Return to PTA regimen    Past Medical History:reviwed in [place].   Discharge Medications:    Current Outpatient Medications   Medication Sig Dispense Refill     methocarbamol (ROBAXIN) 750 MG tablet Take 1 tablet (750 mg) by mouth 3 times daily as needed for muscle spasms 60 tablet 3     oxyCODONE (ROXICODONE) 5 MG tablet 1-2 tabs po q 8 hrs prn 28 tablet 0     acetaminophen (TYLENOL) 500 MG tablet Take 1,000 mg by mouth 4 times daily as needed       albuterol (PROAIR HFA/PROVENTIL HFA/VENTOLIN HFA) 108 (90 Base) MCG/ACT inhaler Inhale 2 puffs into the lungs 4 times daily as needed for shortness of breath / dyspnea or wheezing       amitriptyline (ELAVIL) 50 MG tablet Take 50 mg by mouth At Bedtime       aspirin 81 MG EC tablet Take 81 mg by mouth daily       bulk laxative (BENEFIBER DRINK MIX) packet Take 1 packet by mouth every morning       fluticasone (FLONASE) 50 MCG/ACT nasal spray Spray 1 spray into both nostrils daily as needed for rhinitis or allergies       gabapentin (NEURONTIN) 400 MG capsule Take 400 mg by mouth 3 times daily       insulin aspart (NOVOLOG FLEXPEN) 100 UNIT/ML pen Inject 22 Units Subcutaneous 2 times daily (with meals) Lunch/dinner       insulin aspart (NOVOLOG FLEXPEN) 100 UNIT/ML pen Inject 24 Units Subcutaneous daily (with breakfast)       insulin glargine (LANTUS PEN)  "100 UNIT/ML pen Inject 38 Units Subcutaneous every morning       lisinopril (ZESTRIL) 10 MG tablet Take 10 mg by mouth daily       LORazepam (ATIVAN) 1 MG tablet 1 TABLET BY MOUTH EVERY MORNING;2 TABLETS (2MG) BY MOUTH EVERY AT BEDTIME 28 tablet 0     Menthol, Topical Analgesic, 4 % GEL        QUEtiapine (SEROQUEL XR) 50 MG TB24 24 hr tablet Take 50 mg by mouth At Bedtime       sennosides (SENOKOT) 8.6 MG tablet Take 2 tablets by mouth 2 times daily       simvastatin (ZOCOR) 5 MG tablet Take 5 mg by mouth At Bedtime       Medication Changes/Rationale:   See HPI   Controlled medications sent with patient:   Sent by neuro surg to pharm.      ROS:   4 point ROS including Respiratory, CV, GI and , other than that noted in the HPI,  is negative    Physical Exam:   Vitals: BP 93/46   Pulse 86   Temp 98.5  F (36.9  C)   Resp 18   Ht 1.549 m (5' 1\")   Wt 105 kg (231 lb 8 oz)   SpO2 94%   BMI 43.74 kg/m    BMI= Body mass index is 43.74 kg/m .  GENERAL APPEARANCE:  Alert, in no distress  RESP:  respiratory effort and palpation of chest normal, auscultation of lungs clear , no respiratory distress  CV:  Palpation and auscultation of heart done , rate and rhythm reg, no murmur, no peripheral edema  ABDOMEN:  normal bowel sounds, soft, nontender, no hepatosplenomegaly or other masses  M/S:   Gait and station indep , Digits and nails intact  SKIN:  Inspection and Palpation of skin and subcutaneous tissue healing incision - see pic below. - no longer infected looking   NEURO: 2-12 in normal limits and at patient's baseline    PSYCH:  insight and judgement, memory intact , affect and mood Pleasant           SNF labs: Labs done in SNF are in [place] EPIC. Please refer to them using clickTRUE/Care Everywhere.  Last Comprehensive Metabolic Panel:  Sodium   Date Value Ref Range Status   03/18/2021 137 133 - 144 mmol/L Final     Potassium   Date Value Ref Range Status   03/18/2021 4.0 3.4 - 5.3 mmol/L Final     Chloride   Date " Value Ref Range Status   03/18/2021 107 94 - 109 mmol/L Final     Carbon Dioxide   Date Value Ref Range Status   03/18/2021 25 20 - 32 mmol/L Final     Anion Gap   Date Value Ref Range Status   03/18/2021 5 3 - 14 mmol/L Final     Glucose   Date Value Ref Range Status   03/18/2021 141 (H) 70 - 99 mg/dL Final     Urea Nitrogen   Date Value Ref Range Status   03/18/2021 14 7 - 30 mg/dL Final     Creatinine   Date Value Ref Range Status   03/18/2021 0.55 0.52 - 1.04 mg/dL Final     GFR Estimate   Date Value Ref Range Status   03/18/2021 >90 >60 mL/min/[1.73_m2] Final     Comment:     Non  GFR Calc  Starting 12/18/2018, serum creatinine based estimated GFR (eGFR) will be   calculated using the Chronic Kidney Disease Epidemiology Collaboration   (CKD-EPI) equation.       Calcium   Date Value Ref Range Status   03/18/2021 9.4 8.5 - 10.1 mg/dL Final     Lab Results   Component Value Date    WBC 8.1 03/18/2021     Lab Results   Component Value Date    RBC 3.56 03/18/2021     Lab Results   Component Value Date    HGB 9.9 03/18/2021     Lab Results   Component Value Date    HCT 31.7 03/18/2021     Lab Results   Component Value Date    MCV 89 03/18/2021     Lab Results   Component Value Date    MCH 27.8 03/18/2021     Lab Results   Component Value Date    MCHC 31.2 03/18/2021     Lab Results   Component Value Date    RDW 13.1 03/18/2021     Lab Results   Component Value Date     03/18/2021       DISCHARGE PLAN:    Follow up labs: No labs orders/due    Medical Follow Up: Follow up with primary care provider in 2-4 weeks and f/u with Neuro surg in 8 weeks as directed.     MTM referral needed and placed by this provider: No    Discharge Services: No therapy or home care recommended.               TOTAL DISCHARGE TIME:   Greater than 30 minutes  Electronically signed by:  BECKIE Lara CNP

## 2021-03-23 ENCOUNTER — NURSING HOME VISIT (OUTPATIENT)
Dept: GERIATRICS | Facility: CLINIC | Age: 66
End: 2021-03-23
Payer: MEDICAID

## 2021-03-23 DIAGNOSIS — F41.9 ANXIETY: ICD-10-CM

## 2021-03-23 DIAGNOSIS — G89.18 ACUTE POST-OPERATIVE PAIN: Primary | ICD-10-CM

## 2021-03-23 DIAGNOSIS — Z98.890 HISTORY OF LUMBOSACRAL SPINE SURGERY: ICD-10-CM

## 2021-03-23 DIAGNOSIS — T81.49XA POSTOPERATIVE WOUND CELLULITIS: ICD-10-CM

## 2021-03-23 PROCEDURE — 99316 NF DSCHRG MGMT 30 MIN+: CPT | Performed by: NURSE PRACTITIONER

## 2021-03-23 RX ORDER — METHOCARBAMOL 750 MG/1
750 TABLET, FILM COATED ORAL 3 TIMES DAILY PRN
Qty: 60 TABLET | Refills: 3
Start: 2021-03-23

## 2021-03-23 RX ORDER — OXYCODONE HYDROCHLORIDE 5 MG/1
TABLET ORAL
Qty: 28 TABLET | Refills: 0
Start: 2021-03-23

## 2021-03-23 NOTE — LETTER
3/23/2021        RE: Nicole Guevara  [address] [facility] [address]        [facility] DISCHARGE SUMMARY  PATIENT'S NAME: Nicole Guevara  YOB: 1955  MEDICAL RECORD NUMBER:  [id number]  Place of Service where encounter took place:  [facility] ([facility]) [[id number]]  PRIMARY CARE PROVIDER AND CLINIC RESPONSIBLE AFTER TRANSFER: [facility], [address] / [facility] [address]; Phone: [phone]; Fax: [phone]; Non-G Provider   Transferring providers: BECKIE Evans CNP; Kiko Bishop MD  Recent Hospitalization/ED: [facility] stay 2/23/21 to 3/3/21.  Date of SNF Admission: March/03/2021  Date of SNF (anticipated) Discharge: March/23/2021  Discharged to: previous independent home  Cognitive Scores: BIMS: 15/15  Physical Function: Ambulating 50 ft with 2 WW  DME: None    CODE STATUS/ADVANCE DIRECTIVES DISCUSSION: Full Code   ALLERGIES: Ciprofloxacin, Amoxicillin, Ibuprofen, Metal [staples], Neomycin-polymyxin-hc, and Penicillins    DISCHARGE DIAGNOSIS/NURSING FACILITY COURSE:    Nicole is rehabbing s/p L3-4, L4-5, L5-S1 Posterolateral Fusion, L4-5 Decompression, Posterior Lumbar Interbody Fusion, O-Arm, Stealth, Cell Saver by Dr Trivedi to treat 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  Hospitalization notable for hyperglycemia for DMII and lantus started, pain control, and blood loss anemia noted in progress notes, but HGB in labs stable of 9's, hypotension - ACE decreased  At SNF:  3/8: levaquin 500 q day x 7 days  Increase wound care to BID and BID PRN drainage. ,Please call and update surgeon of new findings. Start oxycodone 5 mg po QID scheduled discontinue current oxycodone prn  Start oxycodone 5 mg po QID PRN moderate to severe pain.   3/9: Change robaxin to 750 mg po QID, start prn vistaril to 50 mg po QID PRN x 14 days  discontinue HS accuchecks and novolg  sliding scale , Change lantus to be to given in am (hold 3/9 and start am 3/10)  3/11: lidoderm patches ordered, but pt didn't want to pay - didn't start.  3/16: scheduled Tylenol , stop mirilax, start benefiber, restart levaquin 500 q am x 7  3/22: NEuro surg f/u - OK to go home.     Today she is now eager to go home.   History of lumbosacral spine surgery: S/p L3-4, L4-5, L5-S1 Posterolateral Fusion, L4-5 Decompression, Posterior Lumbar Interbody Fusion, 2/23/21 and Acute post-operative pain   and Postoperative wound cellulitis  Resolved infection - pain better controlled.   Neuro surg sent scripts to pharm.     Anxiety  PTA medications - will stop vistaril.     DMII  Stable at [facility]. Return to PTA regimen    Past Medical History:reviwed in [facility].   Discharge Medications:    Current Outpatient Medications   Medication Sig Dispense Refill     methocarbamol (ROBAXIN) 750 MG tablet Take 1 tablet (750 mg) by mouth 3 times daily as needed for muscle spasms 60 tablet 3     oxyCODONE (ROXICODONE) 5 MG tablet 1-2 tabs po q 8 hrs prn 28 tablet 0     acetaminophen (TYLENOL) 500 MG tablet Take 1,000 mg by mouth 4 times daily as needed       albuterol (PROAIR HFA/PROVENTIL HFA/VENTOLIN HFA) 108 (90 Base) MCG/ACT inhaler Inhale 2 puffs into the lungs 4 times daily as needed for shortness of breath / dyspnea or wheezing       amitriptyline (ELAVIL) 50 MG tablet Take 50 mg by mouth At Bedtime       aspirin 81 MG EC tablet Take 81 mg by mouth daily       bulk laxative (BENEFIBER DRINK MIX) packet Take 1 packet by mouth every morning       fluticasone (FLONASE) 50 MCG/ACT nasal spray Spray 1 spray into both nostrils daily as needed for rhinitis or allergies       gabapentin (NEURONTIN) 400 MG capsule Take 400 mg by mouth 3 times daily       insulin aspart (NOVOLOG FLEXPEN) 100 UNIT/ML pen Inject 22 Units Subcutaneous 2 times daily (with meals) Lunch/dinner       insulin aspart (NOVOLOG FLEXPEN) 100 UNIT/ML pen  "Inject 24 Units Subcutaneous daily (with breakfast)       insulin glargine (LANTUS PEN) 100 UNIT/ML pen Inject 38 Units Subcutaneous every morning       lisinopril (ZESTRIL) 10 MG tablet Take 10 mg by mouth daily       LORazepam (ATIVAN) 1 MG tablet 1 TABLET BY MOUTH EVERY MORNING;2 TABLETS (2MG) BY MOUTH EVERY AT BEDTIME 28 tablet 0     Menthol, Topical Analgesic, 4 % GEL        QUEtiapine (SEROQUEL XR) 50 MG TB24 24 hr tablet Take 50 mg by mouth At Bedtime       sennosides (SENOKOT) 8.6 MG tablet Take 2 tablets by mouth 2 times daily       simvastatin (ZOCOR) 5 MG tablet Take 5 mg by mouth At Bedtime       Medication Changes/Rationale:   See HPI   Controlled medications sent with patient:   Sent by neuro surg to pharm.      ROS:   4 point ROS including Respiratory, CV, GI and , other than that noted in the HPI,  is negative    Physical Exam:   Vitals: BP 93/46   Pulse 86   Temp 98.5  F (36.9  C)   Resp 18   Ht 1.549 m (5' 1\")   Wt 105 kg (231 lb 8 oz)   SpO2 94%   BMI 43.74 kg/m    BMI= Body mass index is 43.74 kg/m .  GENERAL APPEARANCE:  Alert, in no distress  RESP:  respiratory effort and palpation of chest normal, auscultation of lungs clear , no respiratory distress  CV:  Palpation and auscultation of heart done , rate and rhythm reg, no murmur, no peripheral edema  ABDOMEN:  normal bowel sounds, soft, nontender, no hepatosplenomegaly or other masses  M/S:   Gait and station indep , Digits and nails intact  SKIN:  Inspection and Palpation of skin and subcutaneous tissue healing incision - see pic below. - no longer infected looking   NEURO: 2-12 in normal limits and at patient's baseline    PSYCH:  insight and judgement, memory intact , affect and mood Pleasant           SNF labs: Labs done in SNF are in [facility]. Please refer to them using [employer] Everywhere.  Last Comprehensive Metabolic Panel:  Sodium   Date Value Ref Range Status   03/18/2021 137 133 - 144 mmol/L Final     Potassium   Date " Value Ref Range Status   03/18/2021 4.0 3.4 - 5.3 mmol/L Final     Chloride   Date Value Ref Range Status   03/18/2021 107 94 - 109 mmol/L Final     Carbon Dioxide   Date Value Ref Range Status   03/18/2021 25 20 - 32 mmol/L Final     Anion Gap   Date Value Ref Range Status   03/18/2021 5 3 - 14 mmol/L Final     Glucose   Date Value Ref Range Status   03/18/2021 141 (H) 70 - 99 mg/dL Final     Urea Nitrogen   Date Value Ref Range Status   03/18/2021 14 7 - 30 mg/dL Final     Creatinine   Date Value Ref Range Status   03/18/2021 0.55 0.52 - 1.04 mg/dL Final     GFR Estimate   Date Value Ref Range Status   03/18/2021 >90 >60 mL/min/[1.73_m2] Final     Comment:     Non  GFR Calc  Starting 12/18/2018, serum creatinine based estimated GFR (eGFR) will be   calculated using the Chronic Kidney Disease Epidemiology Collaboration   (CKD-EPI) equation.       Calcium   Date Value Ref Range Status   03/18/2021 9.4 8.5 - 10.1 mg/dL Final     Lab Results   Component Value Date    WBC 8.1 03/18/2021     Lab Results   Component Value Date    RBC 3.56 03/18/2021     Lab Results   Component Value Date    HGB 9.9 03/18/2021     Lab Results   Component Value Date    HCT 31.7 03/18/2021     Lab Results   Component Value Date    MCV 89 03/18/2021     Lab Results   Component Value Date    MCH 27.8 03/18/2021     Lab Results   Component Value Date    MCHC 31.2 03/18/2021     Lab Results   Component Value Date    RDW 13.1 03/18/2021     Lab Results   Component Value Date     03/18/2021       DISCHARGE PLAN:    Follow up labs: No labs orders/due    Medical Follow Up: Follow up with primary care provider in 2-4 weeks and f/u with Neuro surg in 8 weeks as directed.     MTM referral needed and placed by this provider: No    Discharge Services: No therapy or home care recommended.               TOTAL DISCHARGE TIME:   Greater than 30 minutes  Electronically signed by:  BECKIE Lara CNP                Sincerely,        BECKIE Lara CNP

## 2021-05-27 ENCOUNTER — RECORDS - HEALTHEAST (OUTPATIENT)
Dept: ADMINISTRATIVE | Facility: CLINIC | Age: 66
End: 2021-05-27

## 2022-09-06 NOTE — PROGRESS NOTES
"Patient with anxiety attack today, taking lorazepam 1mg am and 2mg pm scheduled, also on seroquel 50mg at bedtime, having \"a bit of issue\" with family member over phone and was yelling, no longer on phone, quite upset about potentially not being picked up on discharge next week, redirection and deep breathing were not effective.  -lorazepam additional 1mg stat plus additional 1mg PRN today if indicated  " No

## 2023-07-06 ENCOUNTER — LAB REQUISITION (OUTPATIENT)
Dept: LAB | Facility: CLINIC | Age: 68
End: 2023-07-06

## 2023-07-06 PROCEDURE — 87624 HPV HI-RISK TYP POOLED RSLT: CPT

## 2023-07-06 PROCEDURE — G0145 SCR C/V CYTO,THINLAYER,RESCR: HCPCS

## 2023-07-06 PROCEDURE — 87491 CHLMYD TRACH DNA AMP PROBE: CPT

## 2023-07-06 PROCEDURE — 87591 N.GONORRHOEAE DNA AMP PROB: CPT

## 2023-07-08 LAB
C TRACH DNA SPEC QL PROBE+SIG AMP: NEGATIVE
N GONORRHOEA DNA SPEC QL NAA+PROBE: NEGATIVE

## 2023-07-11 LAB
BKR LAB AP GYN ADEQUACY: NORMAL
BKR LAB AP GYN INTERPRETATION: NORMAL
BKR LAB AP HPV REFLEX: NORMAL
BKR LAB AP PREVIOUS ABNORMAL: NORMAL
PATH REPORT.COMMENTS IMP SPEC: NORMAL
PATH REPORT.COMMENTS IMP SPEC: NORMAL
PATH REPORT.RELEVANT HX SPEC: NORMAL

## 2023-07-13 LAB
HUMAN PAPILLOMA VIRUS 16 DNA: NEGATIVE
HUMAN PAPILLOMA VIRUS 18 DNA: NEGATIVE
HUMAN PAPILLOMA VIRUS FINAL DIAGNOSIS: NORMAL
HUMAN PAPILLOMA VIRUS OTHER HR: NEGATIVE